# Patient Record
Sex: MALE | Race: WHITE | NOT HISPANIC OR LATINO | Employment: OTHER | URBAN - METROPOLITAN AREA
[De-identification: names, ages, dates, MRNs, and addresses within clinical notes are randomized per-mention and may not be internally consistent; named-entity substitution may affect disease eponyms.]

---

## 2023-10-16 PROBLEM — R06.02 SHORT OF BREATH ON EXERTION: Status: ACTIVE | Noted: 2023-10-16

## 2023-10-16 PROBLEM — H90.3 HEARING LOSS, SENSORINEURAL, ASYMMETRICAL: Status: ACTIVE | Noted: 2023-10-16

## 2023-10-16 PROBLEM — R26.89 IMBALANCE: Status: ACTIVE | Noted: 2023-10-16

## 2023-10-16 PROBLEM — R41.841 COGNITIVE COMMUNICATION DEFICIT: Status: ACTIVE | Noted: 2023-10-16

## 2023-10-16 PROBLEM — R01.1 MURMUR, CARDIAC: Status: ACTIVE | Noted: 2023-10-16

## 2023-10-16 PROBLEM — I45.2 RBBB (RIGHT BUNDLE BRANCH BLOCK WITH LEFT ANTERIOR FASCICULAR BLOCK): Status: ACTIVE | Noted: 2023-10-16

## 2023-10-16 PROBLEM — I35.0 AORTIC STENOSIS: Status: ACTIVE | Noted: 2023-10-16

## 2023-10-16 PROBLEM — Z96.21 COCHLEAR IMPLANT IN PLACE: Status: ACTIVE | Noted: 2023-10-16

## 2023-10-16 PROBLEM — I10 HTN (HYPERTENSION), BENIGN: Status: ACTIVE | Noted: 2023-10-16

## 2023-10-16 RX ORDER — MIRABEGRON 25 MG/1
1 TABLET, FILM COATED, EXTENDED RELEASE ORAL DAILY
COMMUNITY
Start: 2021-11-22

## 2023-10-16 RX ORDER — TAMSULOSIN HYDROCHLORIDE 0.4 MG/1
1 CAPSULE ORAL DAILY
COMMUNITY

## 2023-10-16 RX ORDER — SERTRALINE HYDROCHLORIDE 25 MG/1
1 TABLET, FILM COATED ORAL DAILY
COMMUNITY

## 2023-10-27 ENCOUNTER — CLINICAL SUPPORT (OUTPATIENT)
Dept: AUDIOLOGY | Facility: CLINIC | Age: 78
End: 2023-10-27
Payer: MEDICARE

## 2023-10-27 DIAGNOSIS — Z96.21 COCHLEAR IMPLANT IN PLACE: ICD-10-CM

## 2023-10-27 DIAGNOSIS — H90.3 HEARING LOSS, SENSORINEURAL, ASYMMETRICAL: Primary | ICD-10-CM

## 2023-10-27 PROCEDURE — V5299 HEARING SERVICE: HCPCS | Performed by: AUDIOLOGIST

## 2023-10-27 NOTE — PROGRESS NOTES
EARMOLD CHECK      Name:  Cam Jaeger  :  1945  Age:  78 y.o.  Date of Evaluation:  10/27/2023     RIGHT DEVICE  External Device: Cochlear [id number] (N8) sound processor EAS / 85 power size 3   Internal Device: Cochlear [employer]  DEXstudy  Surgeon: Susan Lechuga MD  Implantation Date: 2022  Activation Date: 09/10/2022  Earmold: soft skeleton, s/n [id number]  Repair and L&D warranty ends 23    LEFT DEVICE  External Device: Resound One 7 BTE with telecoil [id number]  Warranty: 2024    HISTORY  Cam Jaeger arrives today for an earmold issue with his right earmold and at the top of the pinna where the processor/wire sits. He reports since the wire was reduced from size 3 to a size 2, his right earmold will hurt after several hours of use. He reports he would like a compact battery for his N8, which he felt as though he is missing one.     PROCEDURE  Otoscopy revealed clear ear canal and tympanic membrane visualized bilaterally. Headset pressure, magnet strength. and incision site were checked with no problems noted.     No visual signs of redness in ear canal. Changed to  size 3 (from 2) to allow less tension on the pinna and in the ear canal. He reports it feels different but unsure if it is better. Will wear for several hours and see if it improves comfort.    IMPRESSIONS  Advised his earmold is out of warranty, I can modify the mold or make a new mold in addition to what was completed today. He would like to listen to a program that is not EAS. We will schedule an appointment to test his EAS program as well as an electric only program. He also requests if he could use a dome on the , we will consider at next appointment. He also requests additional programing of his left hearing aid. Will contact Dr. Alejandra re: compact battery. Dispensed all equipment including the size 2 85  if he decides to switch back.     Billed $30 to patient  today.    RECOMMENDATIONS  1. Continue medical follow-up with your neuro-otologist (Dr. Lechuga, Dr. Melvin, Dr. Solorio or Dr. Hyde)  2. Utilize the Cochlear miLibriss Nucleus Cochlear [id number] (N8) sound processor EAS daily using the most tolerated program.  3. Return for programming, optimization, and functional testing of EAS and non-EAS program at patient request.   4. Utilize aural rehabilitation/auditory training programs, books on tape, and written materials at home to improve speech understanding ability.  5. Communication strategies were discussed (utilizing visual cues/gestures; reducing background noise and distance from desired source; increasing light to assist with visual cues; use of clear speech).  6. Continue AV therapy at [facility].    JUDD Catherine.  Audiology Extern     DADA Bueno, CCC-A  Senior Clinical Audiologist    TIME: [phone]

## 2024-03-28 ENCOUNTER — CLINICAL SUPPORT (OUTPATIENT)
Dept: AUDIOLOGY | Facility: CLINIC | Age: 79
End: 2024-03-28
Payer: MEDICARE

## 2024-03-28 DIAGNOSIS — H90.3 HEARING LOSS, SENSORINEURAL, ASYMMETRICAL: Primary | ICD-10-CM

## 2024-03-28 DIAGNOSIS — Z96.21 COCHLEAR IMPLANT IN PLACE: ICD-10-CM

## 2024-03-28 PROCEDURE — 92552 PURE TONE AUDIOMETRY AIR: CPT | Performed by: AUDIOLOGIST

## 2024-03-28 PROCEDURE — 92604 REPROGRAM COCHLEAR IMPLT 7/>: CPT | Performed by: AUDIOLOGIST

## 2024-03-28 PROCEDURE — 92567 TYMPANOMETRY: CPT | Performed by: AUDIOLOGIST

## 2024-03-28 PROCEDURE — V5011 HEARING AID FITTING/CHECKING: HCPCS | Performed by: AUDIOLOGIST

## 2024-03-28 NOTE — PROGRESS NOTES
COCHLEAR IMPLANT PROGRAMMING and FUNCTIONAL TESTING       Name:  Cam Jaeger  :  1945  Age:  78 y.o.  Date of Evaluation:  3/28/2024     RIGHT DEVICE  External Device: Cochlear [id number] (N8) sound processor EAS / 85 power size 3   Internal Device: Cochlear [employer]  DEXstudy  Surgeon: Susan Lechuga MD  Implantation Date: 2022  Activation Date: 09/10/2022  Earmold: soft skeleton, s/n [id number] Repair and L&D warranty ends 23    LEFT DEVICE  External Device: Resound One 7 BTE with telecoil [id number]  Warranty: 2024    HISTORY  Cam Jaeger arrives today for programming and optimization of the right cochlear implant used in conjunction with a Cochlear [id number] (N7) sound processor with the acoustic 85 power . The patient reports echoing, speech is clearer if he turns the volume down. Reports feeling that his hearing is fluctuating, he has good days and bad days. Is unsure if equipment is fluctuating. Reports the battery for his left HA works only a few hours.    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    RIGHT CI PROGRAMMING  Headset pressure, magnet strength (3I), and incision site were checked with no problems noted. Listening check of sound processor microphones and acoustic  revealed adequate microphone function.  85 power  with NAL fitting strategy. Datalogging revealed 12+ hours of use per day with 3+ hours in speech, on average since the last appointment.    Electrode impedances, used to monitor internal device function, were measured across the electrode array.  Impedance measures were within normal limits for all electrodes, in each stimulation mode. Programming began from MAP 29.  Updated audiogram in software. Current parameters of MP1+2 stimulation mode, an ACE speech processing strategy, 8 maxima, and a 500 Hz stimulation rate were maintained. Acoustic >1225 Hz and electric >938  Hz.  Comfort (C) levels were measuring using psychophysical loudness scaling method on a variety of channels with the remaining channels interpolated.  C levels were reduced for live speech globally. C levels were swept for loudness balancing (MAP 37). Demonstrated MAP 34 for the patient which is an electric only program and he reported immediately it was poor sound quality and he could not tolerate the sound. No facial stimulation or non-auditory stimulation observed during live speech. Current programming consists of:  Program 1 - MAP 37 - SCAN  enabled WNR/NR  Standard/Fixed/Adaptive Bib  V: 6  S: 12  Program 2 - MAP 37 - ADRO + AutoS  enabled WNR/NR  Fixed Bib  V: 6  S: 8    LEFT HA PROGRAMMING  Tubing on left earmold is hard, replaced today. Listening check ok. Updated audiogram in [facility] and Resound SmartFit. Changed fitting algorithm from Audiogram+ to NALN2. Increased overall gain by 2 steps. Reports sound is louder from right HA and little perceived difference in hearing with the changes made from left HA.    UNAIDED TESTING (puretone audiometry only 125-8000 Hz)  RIGHT: Tympanometry revealed normal middle ear pressure, mobility, and ear canal volume.  Moderate to profound sensorineural hearing loss.  LEFT: Tympanometry revealed normal middle ear pressure, mobility, and ear canal volume.  Moderate to profound sensorineural hearing loss.    IMPRESSIONS  Unaided hearing is stable as compared to 9/2023 and 3/2022 hearing evaluation. Changed left tubing because it was hardened and updated programming in left HA to NALN2 from Audiogram +. He does not want to send the HA for a repair of the battery at this point, advised him the repair warranty ends in Dec, 2024.     Positive stimulation on all active electrodes on right CI.  A reliable psychophysical MAP was defined in the ACE speech processing strategy. Pt had an opportunity to listen to an electric only MAP which he did not prefer. He opted to have 2  listening programs to allow for more use - he reports music program is not helpful.     RECOMMENDATIONS  1. Continue medical follow-up with your neuro-otologist (Dr. Lechuga, Dr. Melvin, Dr. Solorio or Dr. Hyde)  2. Utilize the Cochlear [employer] Nucleus Cochlear [id number] (N7) sound processor in the bimodal condition daily using the most tolerated program.  3. Return annually (Sept 2024) for programming, optimization, and functional testing.   4. Utilize aural rehabilitation/auditory training programs, books on tape, and written materials at home to improve speech understanding ability.  5. Communication strategies were discussed (utilizing visual cues/gestures; reducing background noise and distance from desired source; increasing light to assist with visual cues; use of clear speech).  6. Contact [facility] auditory-verbal therapist to initiate aural rehabilitation therapy.    DADA Bueno, CCC-A  Senior Clinical Audiologist    TIME: [phone]